# Patient Record
Sex: MALE | URBAN - METROPOLITAN AREA
[De-identification: names, ages, dates, MRNs, and addresses within clinical notes are randomized per-mention and may not be internally consistent; named-entity substitution may affect disease eponyms.]

---

## 2017-07-23 ENCOUNTER — EMERGENCY (EMERGENCY)
Facility: HOSPITAL | Age: 22
LOS: 0 days | Discharge: HOME | End: 2017-07-23
Attending: STUDENT IN AN ORGANIZED HEALTH CARE EDUCATION/TRAINING PROGRAM
Payer: SELF-PAY

## 2017-07-23 VITALS
SYSTOLIC BLOOD PRESSURE: 123 MMHG | HEART RATE: 68 BPM | DIASTOLIC BLOOD PRESSURE: 82 MMHG | TEMPERATURE: 98 F | RESPIRATION RATE: 17 BRPM | OXYGEN SATURATION: 99 %

## 2017-07-23 VITALS
DIASTOLIC BLOOD PRESSURE: 75 MMHG | TEMPERATURE: 98 F | SYSTOLIC BLOOD PRESSURE: 120 MMHG | RESPIRATION RATE: 20 BRPM | OXYGEN SATURATION: 99 % | HEART RATE: 87 BPM | WEIGHT: 128.09 LBS

## 2017-07-23 DIAGNOSIS — S06.0X9A CONCUSSION WITH LOSS OF CONSCIOUSNESS OF UNSPECIFIED DURATION, INITIAL ENCOUNTER: ICD-10-CM
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
## 2017-07-23 DIAGNOSIS — S09.90XA UNSPECIFIED INJURY OF HEAD, INITIAL ENCOUNTER: ICD-10-CM
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
## 2017-07-23 PROCEDURE — 70450 CT HEAD/BRAIN W/O DYE: CPT | Mod: 26

## 2017-07-23 PROCEDURE — 99284 EMERGENCY DEPT VISIT MOD MDM: CPT

## 2017-07-23 NOTE — ED ADULT NURSE NOTE - OBJECTIVE STATEMENT
'" It was raining last night when I came home from work. I slipped and fell and hit my head. I did not pass out"

## 2017-07-23 NOTE — ED ADULT TRIAGE NOTE - CHIEF COMPLAINT QUOTE
patient c/o of severe headache with nausea started last night patient fall on the stairs land on cement , denied LOC , patient has two small laceration forehead , patient stated " I feel very tired after the fall "

## 2017-07-23 NOTE — ED PROVIDER NOTE - MEDICAL DECISION MAKING DETAILS
ct head negative, tylenol for his headache, steristrips applied to his laceration, pt started on keflex

## 2017-07-23 NOTE — ED ADULT NURSE REASSESSMENT NOTE - CONDITION
D/C home. Aware to return for weakness/confusion/visual disturbance/fainting incontinence or any concern. aware to rest,stay well hydrated and avoid exertion/ heavy activity. Patient A&OOx3,gait staedy. Steristrips intact to forehead. Patient aware to keep area clean and dry.aware to return for purulent drainage/fever culls or any concern. Patient verbalized understanding and left ED.MAX Somers RN/improved

## 2017-07-23 NOTE — ED PROVIDER NOTE - OBJECTIVE STATEMENT
22 year old male with no past medical history presents today c/o tripping and falling down one step outside the steps of his home, pt fell face foreward striking his forehead (-) LOC (-) nauseaor vomiting (-) neck pain +headache rated 7/10 (-) visual changes

## 2022-10-04 NOTE — ED PROVIDER NOTE - NSTIMEPROVIDERCAREINITIATE_GEN_ER
23-Jul-2017 14:37 Brow Lift Text: A midfrontal incision was made medially to the defect to allow access to the tissues just superior to the left eyebrow. Following careful dissection inferiorly in a supraperiosteal plane to the level of the left eyebrow, several 3-0 monocryl sutures were used to resuspend the eyebrow orbicularis oculi muscular unit to the superior frontal bone periosteum. This resulted in an appropriate reapproximation of static eyebrow symmetry and correction of the left brow ptosis.